# Patient Record
Sex: FEMALE | Race: WHITE | Employment: FULL TIME | URBAN - METROPOLITAN AREA
[De-identification: names, ages, dates, MRNs, and addresses within clinical notes are randomized per-mention and may not be internally consistent; named-entity substitution may affect disease eponyms.]

---

## 2021-07-27 PROBLEM — N81.2 INCOMPLETE UTERINE PROLAPSE: Status: ACTIVE | Noted: 2019-05-24

## 2021-07-27 PROBLEM — N85.01 ENDOMETRIAL HYPERPLASIA WITHOUT ATYPIA, SIMPLE: Status: ACTIVE | Noted: 2021-07-02

## 2021-07-27 PROBLEM — N83.209 OVARIAN CYST: Status: ACTIVE | Noted: 2018-09-21

## 2021-07-27 PROBLEM — N81.11 CYSTOCELE, MIDLINE: Status: ACTIVE | Noted: 2021-07-02

## 2021-07-27 PROBLEM — N92.1 MENORRHAGIA WITH IRREGULAR CYCLE: Status: ACTIVE | Noted: 2021-07-02

## 2021-07-27 RX ORDER — MEDROXYPROGESTERONE ACETATE 10 MG/1
10 TABLET ORAL DAILY
COMMUNITY
Start: 2021-06-11 | End: 2021-11-12 | Stop reason: ALTCHOICE

## 2021-07-28 ENCOUNTER — OFFICE VISIT (OUTPATIENT)
Dept: UROGYNECOLOGY | Age: 51
End: 2021-07-28
Payer: COMMERCIAL

## 2021-07-28 VITALS
TEMPERATURE: 97.5 F | SYSTOLIC BLOOD PRESSURE: 120 MMHG | RESPIRATION RATE: 16 BRPM | OXYGEN SATURATION: 99 % | HEART RATE: 87 BPM | DIASTOLIC BLOOD PRESSURE: 64 MMHG

## 2021-07-28 DIAGNOSIS — N81.11 CYSTOCELE, MIDLINE: ICD-10-CM

## 2021-07-28 DIAGNOSIS — N81.4 UTERINE PROLAPSE: ICD-10-CM

## 2021-07-28 DIAGNOSIS — R35.0 URINARY FREQUENCY: Primary | ICD-10-CM

## 2021-07-28 DIAGNOSIS — N81.6 RECTOCELE: ICD-10-CM

## 2021-07-28 LAB
BILIRUBIN, POC: NORMAL
BLOOD URINE, POC: NORMAL
CLARITY, POC: CLEAR
COLOR, POC: YELLOW
EMPTY COUGH STRESS TEST: NORMAL
FIRST SENSATION: 80 CC
FULL COUGH STRESS TEST: NORMAL
GLUCOSE URINE, POC: NORMAL
KETONES, POC: NORMAL
LEUKOCYTE EST, POC: NORMAL
MAX SENSATION: 250 CC
NITRATE, URINE POC: NORMAL
NITRITE, POC: NORMAL
PH, POC: 6.5
POST VOID RESIDUAL (PVR): 10 ML
PROTEIN, POC: NORMAL
RBC URINE, POC: NORMAL
SECOND SENSATION: 120 CC
SPASM: NORMAL
SPECIFIC GRAVITY, POC: 1.01
UROBILINOGEN, POC: NORMAL
WBC URINE, POC: NORMAL

## 2021-07-28 PROCEDURE — 81002 URINALYSIS NONAUTO W/O SCOPE: CPT | Performed by: OBSTETRICS & GYNECOLOGY

## 2021-07-28 PROCEDURE — 51725 SIMPLE CYSTOMETROGRAM: CPT | Performed by: OBSTETRICS & GYNECOLOGY

## 2021-07-28 PROCEDURE — 99244 OFF/OP CNSLTJ NEW/EST MOD 40: CPT | Performed by: OBSTETRICS & GYNECOLOGY

## 2021-07-28 NOTE — PROGRESS NOTES
2021      HPI:     Name: Peggy Garcia  YOB: 1970    CC: Patient is a 45 y.o. female who is seen in consultation from Kaylynn Keith   for evaluation of prolapse. HPI:  Bladder control problem: no  Bladder emptying problems: yes, How long have you had bladder emptying problems? 5 years  Do you notice any dribbling of urine when you stand after passing urine? No  Do you usually have difficulty starting your urine stream? No  Do you have to assume abnormal positions to urinate? No   Do you have to strain to empty your bladder? No  Is your urine flow: strong  Do you feel as if your bladder is empty after passing urine? No    Prolapse/Vaginal Support problems: yes, Do you notice a bulge? Yes  Are your symptoms worse at the end of the day or after for prolonged periods? Yes  Do you push the protrusion back to help with a bowel movement or to empty your bladder? No  Have you ever used a pessary (plastic support device) for this problem? No        Bowel problem(s): no  Sexual History:  reports previously being sexually active. Pelvic Pain:  no  Ob/Gyn History:    OB History    Para Term  AB Living   2 2 2     2   SAB TAB Ectopic Molar Multiple Live Births             2      # Outcome Date GA Lbr Fernandez/2nd Weight Sex Delivery Anes PTL Lv   2 Term    6 lb 12 oz (3.062 kg)     ERIC   1 Term    8 lb 6 oz (3.799 kg)     ERIC     Past Medical History:   Past Medical History:   Diagnosis Date    Burning sensation of throat     Chronic constipation     Chronic cryptitis of tonsil     Cystocele, midline     Dyspepsia     Endometrial hyperplasia without atypia, simple     Food allergy     Incomplete uterine prolapse     Menorrhagia with irregular cycle     Ovarian cyst     Palpitations     Pneumonia     Vertigo      Past Surgical History:   Past Surgical History:   Procedure Laterality Date    WISDOM TOOTH EXTRACTION       Allergies:    Allergies   Allergen Reactions    Macrobid [Nitrofurantoin Monohydrate Macrocrystals]      Rapid heartbeat, anxiety, shaking     Current Medications:  Current Outpatient Medications   Medication Sig Dispense Refill    medroxyPROGESTERone (PROVERA) 10 MG tablet Take 10 mg by mouth daily       No current facility-administered medications for this visit. Social History:   Social History     Socioeconomic History    Marital status:      Spouse name: Not on file    Number of children: Not on file    Years of education: Not on file    Highest education level: Not on file   Occupational History    Occupation: CPA   Tobacco Use    Smoking status: Never Smoker    Smokeless tobacco: Never Used   Vaping Use    Vaping Use: Never used   Substance and Sexual Activity    Alcohol use: Yes     Comment: occasionally    Drug use: Never    Sexual activity: Not Currently   Other Topics Concern    Not on file   Social History Narrative    Not on file     Social Determinants of Health     Financial Resource Strain:     Difficulty of Paying Living Expenses:    Food Insecurity:     Worried About Running Out of Food in the Last Year:     [address] in the Last Year:    Transportation Needs:     Lack of Transportation (Medical):      Lack of Transportation (Non-Medical):    Physical Activity:     Days of Exercise per Week:     Minutes of Exercise per Session:    Stress:     Feeling of Stress :    Social Connections:     Frequency of Communication with Friends and Family:     Frequency of Social Gatherings with Friends and Family:     Attends Pentecostal Services:     Active Member of Clubs or Organizations:     Attends Club or Organization Meetings:     Marital Status:    Intimate Partner Violence:     Fear of Current or Ex-Partner:     Emotionally Abused:     Physically Abused:     Sexually Abused:      Family History:   Family History   Problem Relation Age of Onset    Cancer Paternal Grandfather 72        pacemaker-    Cancer Maternal Grandmother 28        ovarian    Cancer Maternal Grandfather         Lung    Heart Disease Maternal Grandfather     High Blood Pressure Mother 27    Hypertension Mother     Heart Disease Father     Hypertension Father      Review of System  Review of Systems   Genitourinary: Positive for menstrual problem. All other systems reviewed and are negative. A review of systems was done by the patient and reviewed by me and scanned into media today. Objective:     Vital Signs  Vitals:    07/28/21 1415   BP: 120/64   Pulse: 87   Resp: 16   Temp: 97.5 °F (36.4 °C)   SpO2: 99%     Physical Exam  Physical Exam  HENT:      Head: Normocephalic and atraumatic. Eyes:      Conjunctiva/sclera: Conjunctivae normal.   Pulmonary:      Effort: Pulmonary effort is normal.   Abdominal:      Palpations: Abdomen is soft. Genitourinary:     Comments: Uterine prolapse, cystocele, rectocele  Musculoskeletal:      Cervical back: Normal range of motion and neck supple. Skin:     General: Skin is warm and dry. Neurological:      Mental Status: She is alert and oriented to person, place, and time. Office Fill Study/Urine Dip:     Using sterile technique a manometry catheter was placed. Patient's bladder was filled with sterile water by gravity. Capacity and storage volumes were measured. Spasms assessed. Catheter was removed. Stress urinary incontinence was assessed.     Results for POC orders placed in visit on 07/28/21   POCT Urinalysis no Micro   Result Value Ref Range    Color, UA yellow     Clarity, UA clear     Glucose, UA POC neg     Bilirubin, UA neg     Ketones, UA neg     Spec Grav, UA 1.015     Blood, UA POC neg     pH, UA 6.5     Protein, UA POC neg     Urobilinogen, UA neg     Leukocytes, UA neg     Nitrite, UA neg        Cystometrogram   wbc urine, poc   Date Value Ref Range Status   07/28/2021 neg  Final     rbc urine, poc   Date Value Ref Range Status   07/28/2021 neg  Final     Nitrate, prolapse. Orders Placed This Encounter   Procedures    POCT Urinalysis no Micro    Cystometrogram     No orders of the defined types were placed in this encounter.       Ade Barrios MD

## 2021-08-24 ENCOUNTER — PROCEDURE VISIT (OUTPATIENT)
Dept: UROGYNECOLOGY | Age: 51
End: 2021-08-24
Payer: COMMERCIAL

## 2021-08-24 VITALS
SYSTOLIC BLOOD PRESSURE: 126 MMHG | HEART RATE: 75 BPM | RESPIRATION RATE: 18 BRPM | TEMPERATURE: 96.7 F | DIASTOLIC BLOOD PRESSURE: 66 MMHG | OXYGEN SATURATION: 100 %

## 2021-08-24 DIAGNOSIS — N81.6 RECTOCELE: ICD-10-CM

## 2021-08-24 DIAGNOSIS — N81.4 UTERINE PROLAPSE: ICD-10-CM

## 2021-08-24 DIAGNOSIS — N81.11 CYSTOCELE, MIDLINE: ICD-10-CM

## 2021-08-24 DIAGNOSIS — N30.10 IC (INTERSTITIAL CYSTITIS): ICD-10-CM

## 2021-08-24 DIAGNOSIS — R35.0 URINARY FREQUENCY: Primary | ICD-10-CM

## 2021-08-24 PROCEDURE — 99214 OFFICE O/P EST MOD 30 MIN: CPT | Performed by: OBSTETRICS & GYNECOLOGY

## 2021-08-24 PROCEDURE — 52285 CYSTOSCOPY AND TREATMENT: CPT | Performed by: OBSTETRICS & GYNECOLOGY

## 2021-08-24 NOTE — LETTER
[facility]  [address] [place] [address] Stephania Arias  Phone: [phone]  Fax: [phone]    Kenzie Cantu MD    August 24, 2021     [address] [id number]    Patient: Андрей Nova   MR Number: <[id number]>   YOB: 1970   Date of Visit: 8/24/2021       Dear Riya Shane:    Thank you for referring Андрей Nova to me for evaluation/treatment. Below are the relevant portions of my assessment and plan of care. If you have questions, please do not hesitate to call me. I look forward to following Ahmet Calvo along with you.     Sincerely,        Kenzie Cantu MD

## 2021-08-24 NOTE — PROGRESS NOTES
Sexual Activity    Alcohol use: Yes     Comment: occasionally    Drug use: Never    Sexual activity: Not Currently   Other Topics Concern    Not on file   Social History Narrative    Not on file     Social Determinants of Health     Financial Resource Strain:     Difficulty of Paying Living Expenses:    Food Insecurity:     Worried About Running Out of Food in the Last Year:     [address] in the Last Year:    Transportation Needs:     Lack of Transportation (Medical):  Lack of Transportation (Non-Medical):    Physical Activity:     Days of Exercise per Week:     Minutes of Exercise per Session:    Stress:     Feeling of Stress :    Social Connections:     Frequency of Communication with Friends and Family:     Frequency of Social Gatherings with Friends and Family:     Attends Confucianism Services:     Active Member of Clubs or Organizations:     Attends Club or Organization Meetings:     Marital Status:    Intimate Partner Violence:     Fear of Current or Ex-Partner:     Emotionally Abused:     Physically Abused:     Sexually Abused:      Family History:   Family History   Problem Relation Age of Onset    Cancer Paternal Grandfather 72        pacemaker-    Cancer Maternal Grandmother 28        ovarian    Cancer Maternal Grandfather         Lung    Heart Disease Maternal Grandfather     High Blood Pressure Mother 27    Hypertension Mother     Heart Disease Father     Hypertension Father      Review of System  Review of Systems   Genitourinary: Positive for menstrual problem. All other systems reviewed and are negative. A review of systems was done by the patient and reviewed by me and scanned into media today. Objective:     Vital Signs  Vitals:    21 1358   BP: 126/66   Pulse: 75   Resp: 18   Temp: 96.7 °F (35.9 °C)   SpO2: 100%      Physical Exam  Physical Exam  HENT:      Head: Normocephalic and atraumatic.    Eyes:      Conjunctiva/sclera: Conjunctivae normal.   Pulmonary:      Effort: Pulmonary effort is normal.   Abdominal:      Palpations: Abdomen is soft. Genitourinary:     Comments: Cystocele, rectocele, uterine prolapse  Musculoskeletal:      Cervical back: Normal range of motion and neck supple. Skin:     General: Skin is warm and dry. Neurological:      Mental Status: She is alert and oriented to person, place, and time. Procedure: The urethral was anesthesized with topical lidocaine jelly and dilated to a #14 Thai. A 0-degree urethroscope was used to examine the urethra. A 70-degree cystoscope was used to evaluate the bladder. FINDINGS:  1. Urethra was normal  2. Bladder had trabeculations mild  3. Trigone appeared Normal  4. Ureters illustrated bilateral efflux  5. Patient exhibited spasmsd uring the study no    Cough stress test: Bladder filled to 200 mL. Patient did not leak with cough stress test.    No results found for this visit on 08/24/21. Assessment/Plan:     Christine Adrian is a 45 y.o. female with   1. Urinary frequency    2. Cystocele, midline    3. Uterine prolapse    4. Rectocele    5. IC (interstitial cystitis)      The patient was counseled on surgical and non-surgical options. The patient elected to move forward with surgery. The risks and benefits of surgery including but not limited to bleeding, infection, injury to bowel, bladder, ureter, or other internal organs, transfusion, pain, bowel dysfunction, urinary incontinence, and sexual dysfunction were discussed at length. All questions were answered to the patients satisfaction. The patient elected to undergo posterior repair, enterocele repair, robotic sacral colpopexy and cystourethroscopy. The risk and benefits of synthetic material, including mesh, were explained to the patient and all questions were answered.   This is a cocase with Dr. Nicole Carlson, preop labs were ordered, records were reviewed  Orders Placed This Encounter   Procedures    MN CYSTOSCOPY,RX FEMALE URETHRAL SYND     No orders of the defined types were placed in this encounter.       Santhosh Reyes MD

## 2021-09-13 ENCOUNTER — TELEPHONE (OUTPATIENT)
Dept: UROGYNECOLOGY | Age: 51
End: 2021-09-13

## 2021-09-13 NOTE — TELEPHONE ENCOUNTER
CoCase with Dr Juana Liriano at [address] [place] [address] scheduled for 9/30/21. Pt verbalized agreement for this date. Liliane Chaparro at Dr Bob Alert office made aware of pts verbalized agreement for this date.

## 2021-09-14 ENCOUNTER — TELEPHONE (OUTPATIENT)
Dept: UROGYNECOLOGY | Age: 51
End: 2021-09-14

## 2021-09-14 NOTE — TELEPHONE ENCOUNTER
Patient moved yesterday and has misplaced her Pre-Op paperwork. Requesting that the information be sent to her e-mail:  [email or web address]

## 2021-09-20 ENCOUNTER — TELEPHONE (OUTPATIENT)
Dept: UROGYNECOLOGY | Age: 51
End: 2021-09-20

## 2021-09-20 DIAGNOSIS — Z01.818 PREOP TESTING: ICD-10-CM

## 2021-09-20 DIAGNOSIS — N81.4 UTERINE PROLAPSE: Primary | ICD-10-CM

## 2021-09-20 NOTE — TELEPHONE ENCOUNTER
[facility] Preop states pt needs preop blood orders for surgery. New orders received by Dr Saundra Erickson. Will fax orders to pt at [email or web address] and mail rx's to pts home.

## 2021-09-23 PROBLEM — D25.1 INTRAMURAL LEIOMYOMA OF UTERUS: Status: ACTIVE | Noted: 2021-09-21

## 2021-10-04 ENCOUNTER — TELEPHONE (OUTPATIENT)
Dept: UROGYNECOLOGY | Age: 51
End: 2021-10-04

## 2021-10-04 NOTE — TELEPHONE ENCOUNTER
Patient reports doing well after surgery. She is urinating well, feels a little pain when stream begins. Does not feel like UTI pain, however I asked her to keep a close eye on it, and if it gets worse or starts to feel like burning UTI symptoms to call us back. Positive for BM  Pain is well controlled with round the clock motrin, and tylenol. She has not felt the need to take narcotic pain reliever yet. Asked her to call of the office is she needed anything, she agreed and was thankful .

## 2021-10-06 ENCOUNTER — OUTSIDE SERVICES (OUTPATIENT)
Dept: GYNECOLOGY | Age: 51
End: 2021-10-06
Payer: COMMERCIAL

## 2021-10-06 DIAGNOSIS — N81.5 VAGINAL ENTEROCELE: ICD-10-CM

## 2021-10-06 DIAGNOSIS — N81.4 UTERINE PROLAPSE: ICD-10-CM

## 2021-10-06 PROCEDURE — 58999 UNLISTED PX FML GENITAL SYS: CPT | Performed by: OBSTETRICS & GYNECOLOGY

## 2021-10-06 PROCEDURE — 57425 LAPAROSCOPY SURG COLPOPEXY: CPT | Performed by: OBSTETRICS & GYNECOLOGY

## 2021-10-15 ENCOUNTER — OFFICE VISIT (OUTPATIENT)
Dept: UROGYNECOLOGY | Age: 51
End: 2021-10-15

## 2021-10-15 VITALS
DIASTOLIC BLOOD PRESSURE: 68 MMHG | TEMPERATURE: 98.9 F | OXYGEN SATURATION: 99 % | HEART RATE: 73 BPM | RESPIRATION RATE: 14 BRPM | SYSTOLIC BLOOD PRESSURE: 126 MMHG

## 2021-10-15 DIAGNOSIS — Z09 POSTOP CHECK: Primary | ICD-10-CM

## 2021-10-15 PROCEDURE — 99024 POSTOP FOLLOW-UP VISIT: CPT | Performed by: NURSE PRACTITIONER

## 2021-10-15 RX ORDER — POLYETHYLENE GLYCOL 3350 17 G/17G
17 POWDER, FOR SOLUTION ORAL EVERY MORNING
COMMUNITY
Start: 2021-10-01

## 2021-10-15 RX ORDER — PSEUDOEPHEDRINE HCL 30 MG
100 TABLET ORAL 2 TIMES DAILY
COMMUNITY
Start: 2021-10-01 | End: 2022-01-19 | Stop reason: SDUPTHER

## 2021-10-15 RX ORDER — OXYCODONE HYDROCHLORIDE 5 MG/1
TABLET ORAL
COMMUNITY
Start: 2021-10-01 | End: 2022-02-17 | Stop reason: ALTCHOICE

## 2021-10-15 NOTE — PROGRESS NOTES
10/15/2021       HPI:     Name: Dl Saucedo  YOB: 1970    CC: Dl Saucedo is a 45 y.o. female who is here for a post op evaluation. HPI: Recently underwent   09/30/21  POSTERIOR REPAIR/ ENTEROCELE REPAIR/ CYSTOSCOPY/ SACROCOLPOPEXY ROBOTIC        Voiding difficulty: No  Initiating stream: No  Force stream: No  Lean forward to empty: No  Slow/intermittent stream: No  Unable to empty bladder: No  Incontinence: no   Urgency without incontinence: Yes   Frequency without incontinence: No   Bowel functions: Normal  Pain Controlled: Yes    Past Medical History:   Past Medical History:   Diagnosis Date    Burning sensation of throat     Chronic constipation     Chronic cryptitis of tonsil     Cystocele, midline     Dyspepsia     Endometrial hyperplasia without atypia, simple     Food allergy     Incomplete uterine prolapse     Menorrhagia with irregular cycle     Ovarian cyst     Palpitations     Pneumonia     Vertigo      Past Surgical History:   Past Surgical History:   Procedure Laterality Date    WISDOM TOOTH EXTRACTION       Current Medications:  Current Outpatient Medications   Medication Sig Dispense Refill    docusate (COLACE, DULCOLAX) 100 MG CAPS Take 100 mg by mouth 2 times daily      medroxyPROGESTERone (PROVERA) 10 MG tablet Take 10 mg by mouth daily      oxyCODONE (ROXICODONE) 5 MG immediate release tablet  (Patient not taking: Reported on 10/15/2021)      polyethylene glycol (GLYCOLAX) 17 GM/SCOOP powder Take 17 g by mouth every morning (Patient not taking: Reported on 10/15/2021)       No current facility-administered medications for this visit. Allergies:    Allergies   Allergen Reactions    Macrobid [Nitrofurantoin Monohydrate Macrocrystals]      Rapid heartbeat, anxiety, shaking     Social History:   Social History     Socioeconomic History    Marital status:      Spouse name: Not on file    Number of children: Not on file    Years of education: Not on file    Highest education level: Not on file   Occupational History    Occupation: CPA   Tobacco Use    Smoking status: Never Smoker    Smokeless tobacco: Never Used   Vaping Use    Vaping Use: Never used   Substance and Sexual Activity    Alcohol use: Yes     Comment: occasionally    Drug use: Never    Sexual activity: Not Currently   Other Topics Concern    Not on file   Social History Narrative    Not on file     Social Determinants of Health     Financial Resource Strain:     Difficulty of Paying Living Expenses:    Food Insecurity:     Worried About Running Out of Food in the Last Year:     [address] in the Last Year:    Transportation Needs:     Lack of Transportation (Medical):  Lack of Transportation (Non-Medical):    Physical Activity:     Days of Exercise per Week:     Minutes of Exercise per Session:    Stress:     Feeling of Stress :    Social Connections:     Frequency of Communication with Friends and Family:     Frequency of Social Gatherings with Friends and Family:     Attends Buddhism Services:     Active Member of Clubs or Organizations:     Attends Club or Organization Meetings:     Marital Status:    Intimate Partner Violence:     Fear of Current or Ex-Partner:     Emotionally Abused:     Physically Abused:     Sexually Abused:      Family History:   Family History   Problem Relation Age of Onset    Cancer Paternal Grandfather 72        pacemaker-    Cancer Maternal Grandmother 28        ovarian    Cancer Maternal Grandfather         Lung    Heart Disease Maternal Grandfather     High Blood Pressure Mother 27    Hypertension Mother     Heart Disease Father     Hypertension Father      Review of System   Review of Systems   All other systems reviewed and are negative. A review of systems was done by the patient and reviewed by me and scanned into media today.     Objective:     Vitals  Vitals:    10/15/21 1133   BP: 126/68   Pulse: 73 Resp: 14   Temp: 98.9 °F (37.2 °C)   SpO2: 99%     Physical Exam  Physical Exam  Vitals and nursing note reviewed. Constitutional:       Appearance: Normal appearance. HENT:      Head: Normocephalic. Eyes:      Conjunctiva/sclera: Conjunctivae normal.   Cardiovascular:      Rate and Rhythm: Normal rate. Pulmonary:      Effort: Pulmonary effort is normal.   Musculoskeletal:         General: Normal range of motion. Cervical back: Normal range of motion. Skin:     General: Skin is warm and dry. Neurological:      General: No focal deficit present. Mental Status: She is alert and oriented to person, place, and time. Psychiatric:         Mood and Affect: Mood normal.         Behavior: Behavior normal.         Thought Content: Thought content normal.       All surgical incisions healing well. No erythema. No evidence of hematoma or abscess. No results found for this visit on 10/15/21. Assessnent/Plan:     Pearl Lynn is a 45 y.o. female with No diagnosis found. Patient is doing well 2 weeks. Restrictions reviewed  Patient is to follow up in 4 weeks . Varghese Brenner, APRN - CNP      No orders of the defined types were placed in this encounter. No orders of the defined types were placed in this encounter.       Shante Sosa RN

## 2021-11-12 ENCOUNTER — OFFICE VISIT (OUTPATIENT)
Dept: UROGYNECOLOGY | Age: 51
End: 2021-11-12

## 2021-11-12 VITALS
OXYGEN SATURATION: 100 % | TEMPERATURE: 96.9 F | RESPIRATION RATE: 18 BRPM | HEART RATE: 72 BPM | DIASTOLIC BLOOD PRESSURE: 66 MMHG | SYSTOLIC BLOOD PRESSURE: 118 MMHG

## 2021-11-12 DIAGNOSIS — N30.10 IC (INTERSTITIAL CYSTITIS): ICD-10-CM

## 2021-11-12 DIAGNOSIS — Z09 POSTOP CHECK: Primary | ICD-10-CM

## 2021-11-12 PROCEDURE — 99024 POSTOP FOLLOW-UP VISIT: CPT | Performed by: NURSE PRACTITIONER

## 2021-11-12 NOTE — PROGRESS NOTES
11/12/2021       HPI:     Name: Kelsey Coburn  YOB: 1970    CC: Kelsey Coburn is a 45 y.o. female who is here for a post op evaluation. HPI: Recently underwent on 09/30/21  POSTERIOR REPAIR/ ENTEROCELE REPAIR/ CYSTOSCOPY/ SACROCOLPOPEXY ROBOTIC. Voiding difficulty: No  Initiating stream: Yes  Force stream: No  Lean forward to empty: No  Slow/intermittent stream: No  Unable to empty bladder: No  Incontinence: no  Urgency without incontinence: No   Frequency without incontinence: No present prior to surgery  Bowel functions: constipation -patient reports constipation started about yesterday  Pain Controlled: Yes-for most part, still a dull ache on lower left side. Past Medical History:   Past Medical History:   Diagnosis Date    Burning sensation of throat     Chronic constipation     Chronic cryptitis of tonsil     Cystocele, midline     Dyspepsia     Endometrial hyperplasia without atypia, simple     Food allergy     Incomplete uterine prolapse     Menorrhagia with irregular cycle     Ovarian cyst     Palpitations     Pneumonia     Vertigo      Past Surgical History:   Past Surgical History:   Procedure Laterality Date    WISDOM TOOTH EXTRACTION       Current Medications:  Current Outpatient Medications   Medication Sig Dispense Refill    docusate (COLACE, DULCOLAX) 100 MG CAPS Take 100 mg by mouth 2 times daily (Patient not taking: Reported on 11/12/2021)      oxyCODONE (ROXICODONE) 5 MG immediate release tablet  (Patient not taking: Reported on 11/12/2021)      polyethylene glycol (GLYCOLAX) 17 GM/SCOOP powder Take 17 g by mouth every morning  (Patient not taking: Reported on 11/12/2021)       No current facility-administered medications for this visit. Allergies:    Allergies   Allergen Reactions    Macrobid [Nitrofurantoin Monohydrate Macrocrystals]      Rapid heartbeat, anxiety, shaking     Social History:   Social History     Socioeconomic History    Marital status:      Spouse name: Not on file    Number of children: Not on file    Years of education: Not on file    Highest education level: Not on file   Occupational History    Occupation: CPA   Tobacco Use    Smoking status: Never Smoker    Smokeless tobacco: Never Used   Vaping Use    Vaping Use: Never used   Substance and Sexual Activity    Alcohol use: Yes     Comment: occasionally    Drug use: Never    Sexual activity: Not Currently   Other Topics Concern    Not on file   Social History Narrative    Not on file     Social Determinants of Health     Financial Resource Strain:     Difficulty of Paying Living Expenses: Not on file   Food Insecurity:     Worried About Running Out of Food in the Last Year: Not on file    Saji of Food in the Last Year: Not on file   Transportation Needs:     Lack of Transportation (Medical): Not on file    Lack of Transportation (Non-Medical):  Not on file   Physical Activity:     Days of Exercise per Week: Not on file    Minutes of Exercise per Session: Not on file   Stress:     Feeling of Stress : Not on file   Social Connections:     Frequency of Communication with Friends and Family: Not on file    Frequency of Social Gatherings with Friends and Family: Not on file    Attends Congregational Services: Not on file    Active Member of [address] [place] [address] [employer] or Organizations: Not on file    Attends Club or Organization Meetings: Not on file    Marital Status: Not on file   Intimate Partner Violence:     Fear of Current or Ex-Partner: Not on file    Emotionally Abused: Not on file    Physically Abused: Not on file    Sexually Abused: Not on file   Housing Stability:     Unable to Pay for Housing in the Last Year: Not on file    Number of [place] in the Last Year: Not on file    Unstable Housing in the Last Year: Not on file     Family History:   Family History   Problem Relation Age of Onset    Cancer Paternal Grandfather 72        pacemaker-    Cancer Maternal Grandmother 28        ovarian    Cancer Maternal Grandfather         Lung    Heart Disease Maternal Grandfather     High Blood Pressure Mother 27    Hypertension Mother     Heart Disease Father     Hypertension Father      Review of System   Review of Systems   All other systems reviewed and are negative. A review of systems was done by the patient and reviewed by me and scanned into media today. Objective:     Vitals  Vitals:    11/12/21 1101   BP: 118/66   Pulse: 72   Resp: 18   Temp: 96.9 °F (36.1 °C)   SpO2: 100%     Physical Exam  Physical Exam  Vitals and nursing note reviewed. Constitutional:       Appearance: Normal appearance. HENT:      Head: Normocephalic. Eyes:      Conjunctiva/sclera: Conjunctivae normal.   Cardiovascular:      Rate and Rhythm: Normal rate. Pulmonary:      Effort: Pulmonary effort is normal.   Musculoskeletal:         General: Normal range of motion. Cervical back: Normal range of motion. Skin:     General: Skin is warm and dry. Neurological:      General: No focal deficit present. Mental Status: She is alert. Psychiatric:         Mood and Affect: Mood normal.         Behavior: Behavior normal.       All surgical incisions healing well. No erythema. No evidence of hematoma or abscess. No results found for this visit on 11/12/21. Assessnent/Plan:     Ada Pool is a 45 y.o. female with   1. Postop check    2. IC (interstitial cystitis)        Patient is doing well 6 weeks. Restrictions reviewed  Patient does have history of IC. We reviewed bladder diet, benefits of PFPT and yoga/stress relief. She is aware of instillations as treatment. She prefers to try to manage without medication  Patient is to follow up in 6 weeks. TOBIN Wright CNP      No orders of the defined types were placed in this encounter. No orders of the defined types were placed in this encounter.       TOBIN Wright CNP

## 2022-01-19 RX ORDER — PSEUDOEPHEDRINE HCL 30 MG
100 TABLET ORAL 2 TIMES DAILY
Qty: 60 CAPSULE | Refills: 0 | Status: SHIPPED | OUTPATIENT
Start: 2022-01-19 | End: 2022-10-25 | Stop reason: SDUPTHER

## 2022-02-08 ENCOUNTER — OFFICE VISIT (OUTPATIENT)
Dept: UROGYNECOLOGY | Age: 52
End: 2022-02-08
Payer: COMMERCIAL

## 2022-02-08 VITALS
HEIGHT: 66 IN | HEART RATE: 64 BPM | DIASTOLIC BLOOD PRESSURE: 79 MMHG | WEIGHT: 124 LBS | SYSTOLIC BLOOD PRESSURE: 122 MMHG | BODY MASS INDEX: 19.93 KG/M2

## 2022-02-08 DIAGNOSIS — R35.0 URINARY FREQUENCY: ICD-10-CM

## 2022-02-08 DIAGNOSIS — R39.15 URGENCY OF URINATION: Primary | ICD-10-CM

## 2022-02-08 DIAGNOSIS — N30.10 IC (INTERSTITIAL CYSTITIS): ICD-10-CM

## 2022-02-08 LAB
BILIRUBIN, POC: NORMAL
BLOOD URINE, POC: NORMAL
CLARITY, POC: CLEAR
COLOR, POC: YELLOW
GLUCOSE URINE, POC: NORMAL
KETONES, POC: NORMAL
LEUKOCYTE EST, POC: NORMAL
NITRITE, POC: NORMAL
PH, POC: 6.5
PROTEIN, POC: NORMAL
SPECIFIC GRAVITY, POC: 1.01
UROBILINOGEN, POC: NORMAL

## 2022-02-08 PROCEDURE — 99213 OFFICE O/P EST LOW 20 MIN: CPT | Performed by: NURSE PRACTITIONER

## 2022-02-08 PROCEDURE — 81002 URINALYSIS NONAUTO W/O SCOPE: CPT | Performed by: NURSE PRACTITIONER

## 2022-02-08 PROCEDURE — 51701 INSERT BLADDER CATHETER: CPT | Performed by: NURSE PRACTITIONER

## 2022-02-08 RX ORDER — LIDOCAINE HYDROCHLORIDE 20 MG/ML
20 INJECTION, SOLUTION INFILTRATION; PERINEURAL ONCE
Status: COMPLETED | OUTPATIENT
Start: 2022-02-08 | End: 2022-02-08

## 2022-02-08 RX ORDER — HEPARIN SODIUM 10000 [USP'U]/ML
10000 INJECTION, SOLUTION INTRAVENOUS; SUBCUTANEOUS ONCE
Status: COMPLETED | OUTPATIENT
Start: 2022-02-08 | End: 2022-02-08

## 2022-02-08 RX ADMIN — HEPARIN SODIUM 10000 UNITS: 10000 INJECTION, SOLUTION INTRAVENOUS; SUBCUTANEOUS at 10:10

## 2022-02-08 RX ADMIN — LIDOCAINE HYDROCHLORIDE 20 ML: 20 INJECTION, SOLUTION INFILTRATION; PERINEURAL at 10:10

## 2022-02-08 RX ADMIN — Medication 5 MEQ: at 10:10

## 2022-02-08 NOTE — PROGRESS NOTES
2022       HPI:     Name: Max Branham  YOB: 1970    CC: Patient is a 45 y.o. presenting for evaluation of bladder pain. HPI: How long have you had this problem? Uncertain  Please rate the severity of your problem: moderate  Anything make it better? Nothing    Patient reports a constant feeling of pressure in her lower abdomen/bladder/vagina. Prolapse surgery 21:  PROCEDURES PERFORMED:    hysterectomy by Dr Dozier Spina  Robotic sacrocolpopexy (Restorelle - Coloplast)   Abdominal enterocele repair    Cysto: 21   Procedure: The urethral was anesthesized with topical lidocaine jelly and dilated to a #14 [place]. A 0-degree urethroscope was used to examine the urethra. A 70-degree cystoscope was used to evaluate the bladder.     FINDINGS:  1. Urethra was normal  2. Bladder had trabeculations mild  3. Trigone appeared Normal  4. Ureters illustrated bilateral efflux  5. Patient exhibited spasmsd uring the study no     Gigi Cobb states she has  Ob/Gyn History:    OB History    Para Term  AB Living   2 2 2     2   SAB IAB Ectopic Molar Multiple Live Births             2      # Outcome Date GA Lbr Fernandez/2nd Weight Sex Delivery Anes PTL Lv   2 Term    6 lb 12 oz (3.062 kg)     ERIC   1 Term    8 lb 6 oz (3.799 kg)     ERIC     Past Medical History:   Past Medical History:   Diagnosis Date    Burning sensation of throat     Chronic constipation     Chronic cryptitis of tonsil     Cystocele, midline     Dyspepsia     Endometrial hyperplasia without atypia, simple     Food allergy     Incomplete uterine prolapse     Menorrhagia with irregular cycle     Ovarian cyst     Palpitations     Pneumonia     Vertigo      Past Surgical History:   Past Surgical History:   Procedure Laterality Date    WISDOM TOOTH EXTRACTION       Allergies:    Allergies   Allergen Reactions    Macrobid [Nitrofurantoin Monohydrate Macrocrystals]      Rapid heartbeat, anxiety, shaking     Current Medications:  Current Outpatient Medications   Medication Sig Dispense Refill    psyllium (KONSYL) 28.3 % PACK Take 1 packet by mouth daily      docusate (COLACE, DULCOLAX) 100 MG CAPS Take 100 mg by mouth 2 times daily 60 capsule 0    oxyCODONE (ROXICODONE) 5 MG immediate release tablet  (Patient not taking: Reported on 11/12/2021)      polyethylene glycol (GLYCOLAX) 17 GM/SCOOP powder Take 17 g by mouth every morning  (Patient not taking: Reported on 11/12/2021)       No current facility-administered medications for this visit. Social History:   Social History     Socioeconomic History    Marital status:      Spouse name: Not on file    Number of children: Not on file    Years of education: Not on file    Highest education level: Not on file   Occupational History    Occupation: CPA   Tobacco Use    Smoking status: Never Smoker    Smokeless tobacco: Never Used   Vaping Use    Vaping Use: Never used   Substance and Sexual Activity    Alcohol use: Yes     Comment: occasionally    Drug use: Never    Sexual activity: Not Currently   Other Topics Concern    Not on file   Social History Narrative    Not on file     Social Determinants of Health     Financial Resource Strain:     Difficulty of Paying Living Expenses: Not on file   Food Insecurity:     Worried About [address] in the Last Year: Not on file    Saji of Food in the Last Year: Not on file   Transportation Needs:     Lack of Transportation (Medical): Not on file    Lack of Transportation (Non-Medical):  Not on file   Physical Activity:     Days of Exercise per Week: Not on file    Minutes of Exercise per Session: Not on file   Stress:     Feeling of Stress : Not on file   Social Connections:     Frequency of Communication with Friends and Family: Not on file    Frequency of Social Gatherings with Friends and Family: Not on file    Attends Orthodox Services: Not on file   [employer] of Clubs or Organizations: Not on file    Attends Club or Organization Meetings: Not on file    Marital Status: Not on file   Intimate Partner Violence:     Fear of Current or Ex-Partner: Not on file    Emotionally Abused: Not on file    Physically Abused: Not on file    Sexually Abused: Not on file   Housing Stability:     Unable to Pay for Housing in the Last Year: Not on file    Number of [place] in the Last Year: Not on file    Unstable Housing in the Last Year: Not on file     Family History:   Family History   Problem Relation Age of Onset    Cancer Paternal Grandfather 72        pacemaker-    Cancer Maternal Grandmother 28        ovarian    Cancer Maternal Grandfather         Lung    Heart Disease Maternal Grandfather     High Blood Pressure Mother 27    Hypertension Mother     Heart Disease Father     Hypertension Father      Review of System   Review of Systems   Genitourinary: Positive for dysuria, frequency, pelvic pain and urgency. A review of systems was done by the patient and reviewed by me and scanned into media today. Objective:     Vitals  Vitals:    22 1006   BP: 122/79   Pulse: 64   Weight: 124 lb (56.2 kg)   Height: 5' 6\" (1.676 m)     Physical Exam  Physical Exam  Vitals and nursing note reviewed. Constitutional:       Appearance: Normal appearance. HENT:      Head: Normocephalic. Eyes:      Conjunctiva/sclera: Conjunctivae normal.   Cardiovascular:      Rate and Rhythm: Normal rate. Pulmonary:      Effort: Pulmonary effort is normal.   Genitourinary:     Comments: Well supported. Pelvic floor muscles Non tender  Musculoskeletal:         General: Normal range of motion. Cervical back: Normal range of motion. Skin:     General: Skin is warm and dry. Neurological:      General: No focal deficit present. Mental Status: She is alert.    Psychiatric:         Mood and Affect: Mood normal.         Behavior: Behavior normal.          Patient counseled and consented on bladder instillation. Urethra was cleaned in sterile fashion. The urethra was then anesthetized using lidocaine gel. Using a sterile catheter, the bladder was first drained then instilled with a medication solution containing heparin sodium 10,000 u, Sol-Cortef 100 mg, 8.4% Sodium Bicarbonate 50 mEq, and Lidocaine 2% 20 mg. Once the entire volume of the solution was instilled, the catheter was removed. The patient was asked to retain the instillation in their bladder for a minimum of 1 hour and should not exceed 3 hours. Patient tolerated the procedure well. She is to call the office with any concerns. Results for POC orders placed in visit on 02/08/22   POCT Urinalysis no Micro   Result Value Ref Range    Color, UA yellow     Clarity, UA clear     Glucose, UA POC neg     Bilirubin, UA neg     Ketones, UA neg     Spec Grav, UA 1.015     Blood, UA POC neg     pH, UA 6.5     Protein, UA POC neg     Urobilinogen, UA neg     Leukocytes, UA neg     Nitrite, UA neg           Assessment/Plan:     Luis Saxena is a 45 y.o. female with   1. Urgency of urination    2. Urinary frequency    3. IC (interstitial cystitis)      -IC vs OAB:  Instillation:  Patient given bladder instillation  She will monitory her symptoms over the next week and return to discuss. If not effective we will consider trial of anticholinergic medication.   Reviewed diet and she is following her bladder diet recommendations  She will return 1 week  for follow up and treatment     TOBIN Ortiz CNP    Orders Placed This Encounter   Procedures    POCT Urinalysis no Micro    77218 - HI INSERT,NON-INDWELLING BLADDER CATHETER     Orders Placed This Encounter   Medications    lidocaine 2 % injection 20 mL    heparin (porcine) injection 10,000 Units    hydrocortisone sodium succinate PF (SOLU-CORTEF) injection 100 mg    sodium bicarbonate 8.4 % injection 5 mEq       TOBIN Ortiz CNP

## 2022-02-17 ENCOUNTER — OFFICE VISIT (OUTPATIENT)
Dept: UROGYNECOLOGY | Age: 52
End: 2022-02-17
Payer: COMMERCIAL

## 2022-02-17 VITALS
SYSTOLIC BLOOD PRESSURE: 118 MMHG | RESPIRATION RATE: 17 BRPM | DIASTOLIC BLOOD PRESSURE: 75 MMHG | OXYGEN SATURATION: 100 % | TEMPERATURE: 98 F | HEART RATE: 65 BPM

## 2022-02-17 DIAGNOSIS — K59.00 CONSTIPATION, UNSPECIFIED CONSTIPATION TYPE: ICD-10-CM

## 2022-02-17 DIAGNOSIS — N30.10 IC (INTERSTITIAL CYSTITIS): Primary | ICD-10-CM

## 2022-02-17 PROCEDURE — 81002 URINALYSIS NONAUTO W/O SCOPE: CPT | Performed by: NURSE PRACTITIONER

## 2022-02-17 PROCEDURE — 51701 INSERT BLADDER CATHETER: CPT | Performed by: NURSE PRACTITIONER

## 2022-02-17 PROCEDURE — 99213 OFFICE O/P EST LOW 20 MIN: CPT | Performed by: NURSE PRACTITIONER

## 2022-02-17 RX ORDER — DOCUSATE SODIUM 100 MG/1
100 CAPSULE, LIQUID FILLED ORAL 2 TIMES DAILY
Qty: 120 CAPSULE | Refills: 2 | Status: SHIPPED | OUTPATIENT
Start: 2022-02-17 | End: 2022-08-16

## 2022-02-17 RX ORDER — LIDOCAINE HYDROCHLORIDE 10 MG/ML
30 INJECTION, SOLUTION INFILTRATION; PERINEURAL ONCE
Status: COMPLETED | OUTPATIENT
Start: 2022-02-17 | End: 2022-02-17

## 2022-02-17 RX ORDER — HEPARIN SODIUM 10000 [USP'U]/ML
10000 INJECTION, SOLUTION INTRAVENOUS; SUBCUTANEOUS ONCE
Status: COMPLETED | OUTPATIENT
Start: 2022-02-17 | End: 2022-02-17

## 2022-02-17 RX ADMIN — LIDOCAINE HYDROCHLORIDE 30 ML: 10 INJECTION, SOLUTION INFILTRATION; PERINEURAL at 10:17

## 2022-02-17 RX ADMIN — HEPARIN SODIUM 10000 UNITS: 10000 INJECTION, SOLUTION INTRAVENOUS; SUBCUTANEOUS at 10:18

## 2022-02-17 RX ADMIN — Medication 5 MEQ: at 10:18

## 2022-02-17 NOTE — PROGRESS NOTES
2022       HPI:     Name: Mimi Christopher  YOB: 1970    CC: Patient is a 45 y.o. presenting for evaluation of has been getting instillations. HPI: How long have you had this problem? months  Please rate the severity of your problem: moderate  Anything make it better? Patient states she feels good. The instillations have helped. She reports excellent results with instillation last week but also was able to identify a trigger to her bladder pain. She has been taking citrucel for constipation and had stopped it a few days last week. Upon restarting her bladder pain returned immediately, she then realized the first ingredient is Citric acid. Her bladder pain has been again minimal upon taking this out of her daily routine. She would like instillation today and then will plan to return with flares  As well, she would like to begin PFPT. Ob/Gyn History:    OB History    Para Term  AB Living   2 2 2     2   SAB IAB Ectopic Molar Multiple Live Births             2      # Outcome Date GA Lbr Fernandez/2nd Weight Sex Delivery Anes PTL Lv   2 Term    6 lb 12 oz (3.062 kg)     ERIC   1 Term    8 lb 6 oz (3.799 kg)     ERIC     Past Medical History:   Past Medical History:   Diagnosis Date    Burning sensation of throat     Chronic constipation     Chronic cryptitis of tonsil     Cystocele, midline     Dyspepsia     Endometrial hyperplasia without atypia, simple     Food allergy     Incomplete uterine prolapse     Menorrhagia with irregular cycle     Ovarian cyst     Palpitations     Pneumonia     Vertigo      Past Surgical History:   Past Surgical History:   Procedure Laterality Date    WISDOM TOOTH EXTRACTION       Allergies:    Allergies   Allergen Reactions    Macrobid [Nitrofurantoin Monohydrate Macrocrystals]      Rapid heartbeat, anxiety, shaking     Current Medications:  Current Outpatient Medications   Medication Sig Dispense Refill    docusate sodium (COLACE) 100 MG capsule Take 1 capsule by mouth 2 times daily 120 capsule 2    psyllium (KONSYL) 28.3 % PACK Take 1 packet by mouth daily      docusate (COLACE, DULCOLAX) 100 MG CAPS Take 100 mg by mouth 2 times daily 60 capsule 0    polyethylene glycol (GLYCOLAX) 17 GM/SCOOP powder Take 17 g by mouth every morning  (Patient not taking: Reported on 11/12/2021)       No current facility-administered medications for this visit. Social History:   Social History     Socioeconomic History    Marital status:      Spouse name: Not on file    Number of children: Not on file    Years of education: Not on file    Highest education level: Not on file   Occupational History    Occupation: CPA   Tobacco Use    Smoking status: Never Smoker    Smokeless tobacco: Never Used   Vaping Use    Vaping Use: Never used   Substance and Sexual Activity    Alcohol use: Yes     Comment: occasionally    Drug use: Never    Sexual activity: Not Currently   Other Topics Concern    Not on file   Social History Narrative    Not on file     Social Determinants of Health     Financial Resource Strain:     Difficulty of Paying Living Expenses: Not on file   Food Insecurity:     Worried About [address] [employer] in the Last Year: Not on file    Saji of Food in the Last Year: Not on file   Transportation Needs:     Lack of Transportation (Medical): Not on file    Lack of Transportation (Non-Medical):  Not on file   Physical Activity:     Days of Exercise per Week: Not on file    Minutes of Exercise per Session: Not on file   Stress:     Feeling of Stress : Not on file   Social Connections:     Frequency of Communication with Friends and Family: Not on file    Frequency of Social Gatherings with Friends and Family: Not on file    Attends Christian Services: Not on file    Active Member of Clubs or Organizations: Not on file    Attends Club or Organization Meetings: Not on file    Marital Status: Not on file   Intimate Partner Violence:     Fear of Current or Ex-Partner: Not on file    Emotionally Abused: Not on file    Physically Abused: Not on file    Sexually Abused: Not on file   Housing Stability:     Unable to Pay for Housing in the Last Year: Not on file    Number of Places Lived in the Last Year: Not on file    Unstable Housing in the Last Year: Not on file     Family History:   Family History   Problem Relation Age of Onset    Cancer Paternal Grandfather 72        pacemaker-    Cancer Maternal Grandmother 28        ovarian    Cancer Maternal Grandfather         Lung    Heart Disease Maternal Grandfather     High Blood Pressure Mother 27    Hypertension Mother     Heart Disease Father     Hypertension Father      Review of System   Review of Systems   All other systems reviewed and are negative. A review of systems was done by the patient and reviewed by me. Objective:     Vitals  Vitals:    22 0947   BP: 118/75   Pulse: 65   Resp: 17   Temp: 98 °F (36.7 °C)   SpO2: 100%     Physical Exam  Physical Exam  Vitals and nursing note reviewed. Constitutional:       Appearance: Normal appearance. HENT:      Head: Normocephalic. Eyes:      Conjunctiva/sclera: Conjunctivae normal.   Cardiovascular:      Rate and Rhythm: Normal rate. Pulmonary:      Effort: Pulmonary effort is normal.   Musculoskeletal:         General: Normal range of motion. Cervical back: Normal range of motion. Skin:     General: Skin is warm and dry. Neurological:      General: No focal deficit present. Mental Status: She is alert. Psychiatric:         Mood and Affect: Mood normal.         Behavior: Behavior normal.       Patient counseled and consented on bladder instillation. Urethra was cleaned in sterile fashion. The urethra was then anesthetized using lidocaine gel.   Using a catheter, the bladder was first drained then instilled with a medication solution containing heparin sodium 10,000 u, Sol-Cortef 100 mg, 8.4% Sodium Bicarbonate 50 mEq, and Lidocaine 1%  30 mg. Once the entire volume of the solution was instilled, the catheter was removed. The patient was asked to retain the instillation in their bladder for a minimum of 1 hour and should not exceed 3 hours. Patient tolerated the procedure well. She is to call the office with any concerns. No results found for this visit on 02/17/22. Assessment/Plan:     Jamila Pleitez is a 45 y.o. female with   1. IC (interstitial cystitis)    2. Constipation, unspecified constipation type      -Records reviewed  -Stable IC:  Given bladder instillation  Patient is doing well at this time. She will continue to follow her bladder diet and has learned of some specific causes of her flares.   She will follow up prn for bladder pain and treatments.  -Constipation:  Colace Rx    Referral given for PFPT    She will follow up prn at this time  TOBIN Wheeler CNP      Orders Placed This Encounter   Procedures    PT pelvic floor activity     The patient can be scheduled with any member of the group including the provider with first available appointment     Standing Status:   Future     Standing Expiration Date:   2/17/2023     Scheduling Instructions:      Pelvic floor physical therapy      Evaluation and treatment     Orders Placed This Encounter   Medications    docusate sodium (COLACE) 100 MG capsule     Sig: Take 1 capsule by mouth 2 times daily     Dispense:  120 capsule     Refill:  [address], APRN - CNP

## 2022-05-17 ENCOUNTER — OFFICE VISIT (OUTPATIENT)
Dept: UROGYNECOLOGY | Age: 52
End: 2022-05-17
Payer: COMMERCIAL

## 2022-05-17 VITALS
SYSTOLIC BLOOD PRESSURE: 111 MMHG | OXYGEN SATURATION: 99 % | RESPIRATION RATE: 14 BRPM | TEMPERATURE: 98 F | DIASTOLIC BLOOD PRESSURE: 59 MMHG | HEART RATE: 68 BPM

## 2022-05-17 DIAGNOSIS — R35.0 URINARY FREQUENCY: Primary | ICD-10-CM

## 2022-05-17 DIAGNOSIS — R39.15 URINARY URGENCY: ICD-10-CM

## 2022-05-17 DIAGNOSIS — N95.2 VAGINAL ATROPHY: ICD-10-CM

## 2022-05-17 PROCEDURE — 99214 OFFICE O/P EST MOD 30 MIN: CPT | Performed by: OBSTETRICS & GYNECOLOGY

## 2022-05-17 RX ORDER — SOLIFENACIN SUCCINATE 10 MG/1
10 TABLET, FILM COATED ORAL DAILY
Qty: 30 TABLET | Refills: 2 | Status: SHIPPED | OUTPATIENT
Start: 2022-05-17

## 2022-05-17 RX ORDER — ESTRADIOL 0.1 MG/G
0.25 CREAM VAGINAL DAILY
Qty: 30 G | Refills: 2 | Status: SHIPPED | OUTPATIENT
Start: 2022-05-17

## 2022-05-17 NOTE — LETTER
[facility]  [address] [place] [address] Stephania Arias  Phone: [phone]  Fax: [phone]    Cruz Brooks MD    May 17, 2022     [facility] [id number]    Patient: Torie Grant   MR Number: [id number]   YOB: 1970   Date of Visit: 5/17/2022       Dear Gissel Corcoran:    Thank you for referring Torie Grant to me for evaluation/treatment. Below are the relevant portions of my assessment and plan of care. If you have questions, please do not hesitate to call me. I look forward to following Rhonda Neves along with you.     Sincerely,      Cruz Brooks MD

## 2022-05-17 NOTE — PROGRESS NOTES
Reported on 5/17/2022)      polyethylene glycol (GLYCOLAX) 17 GM/SCOOP powder Take 17 g by mouth every morning  (Patient not taking: Reported on 11/12/2021)       No current facility-administered medications for this visit. Social History:   Social History     Socioeconomic History    Marital status:      Spouse name: Not on file    Number of children: Not on file    Years of education: Not on file    Highest education level: Not on file   Occupational History    Occupation: CPA   Tobacco Use    Smoking status: Never Smoker    Smokeless tobacco: Never Used   Vaping Use    Vaping Use: Never used   Substance and Sexual Activity    Alcohol use: Yes     Comment: occasionally    Drug use: Never    Sexual activity: Not Currently   Other Topics Concern    Not on file   Social History Narrative    Not on file     Social Determinants of Health     Financial Resource Strain:     Difficulty of Paying Living Expenses: Not on file   Food Insecurity:     Worried About [address] [employer] in the Last Year: Not on file    Saji of Food in the Last Year: Not on file   Transportation Needs:     Lack of Transportation (Medical): Not on file    Lack of Transportation (Non-Medical):  Not on file   Physical Activity:     Days of Exercise per Week: Not on file    Minutes of Exercise per Session: Not on file   Stress:     Feeling of Stress : Not on file   Social Connections:     Frequency of Communication with Friends and Family: Not on file    Frequency of Social Gatherings with Friends and Family: Not on file    Attends Restorationism Services: Not on file    Active Member of Clubs or Organizations: Not on file    Attends Club or Organization Meetings: Not on file    Marital Status: Not on file   Intimate Partner Violence:     Fear of Current or Ex-Partner: Not on file    Emotionally Abused: Not on file    Physically Abused: Not on file    Sexually Abused: Not on file   Housing Stability:     Unable to Pay for Housing in the Last Year: Not on file    Number of Places Lived in the Last Year: Not on file    Unstable Housing in the Last Year: Not on file     Family History:   Family History   Problem Relation Age of Onset    Cancer Paternal Grandfather 72        pacemaker-    Cancer Maternal Grandmother 28        ovarian    Cancer Maternal Grandfather         Lung    Heart Disease Maternal Grandfather     High Blood Pressure Mother 27    Hypertension Mother     Heart Disease Father     Hypertension Father          Objective:     Vitals  Vitals:    22 1541   BP: (!) 111/59   Pulse: 68   Resp: 14   Temp: 98 °F (36.7 °C)   SpO2: 99%     Physical Exam  Physical Exam  HENT:      Head: Normocephalic and atraumatic. Eyes:      Conjunctiva/sclera: Conjunctivae normal.   Pulmonary:      Effort: Pulmonary effort is normal.   Abdominal:      Palpations: Abdomen is soft. Genitourinary:     Comments: Good support to the pelvic floor, vaginal atrophy  Musculoskeletal:      Cervical back: Normal range of motion and neck supple. Skin:     General: Skin is warm and dry. Neurological:      Mental Status: She is alert and oriented to person, place, and time. No results found for this visit on 22. Assessment/Plan:     Chioma To is a 45 y.o. female with   1. Urinary frequency    2. Urinary urgency    3. Vaginal atrophy    Old records reviewed, outside records reviewed    Zonia Varghese presents today really complaining of urinary urgency frequency and urge incontinence. It has been quite bothersome to her. She did follow-up with pelvic floor physical therapy however on my examination today her pelvic floor muscles are very weak. She has a previous diagnosis of interstitial cystitis however she tried a couple of installations and it did not help.   I do think she has symptoms of overactive bladder and I am going to have her try Vesicare and go back to pelvic floor physical therapy at .  She will call us with the name of a pelvic floor physical therapist closer to her home in [place]. No orders of the defined types were placed in this encounter. Orders Placed This Encounter   Medications    solifenacin (VESICARE) 10 MG tablet     Sig: Take 1 tablet by mouth daily     Dispense:  30 tablet     Refill:  2    estradiol (ESTRACE VAGINAL) 0.1 MG/GM vaginal cream     Sig: Place 0.25 g vaginally daily Apply 0.25g with fingertip to the vaginal opening every night at bedtime for 2 weeks, then decrease to twice weekly.      Dispense:  30 g     Refill:  2       Ade Barrios MD

## 2022-06-13 RX ORDER — SOLIFENACIN SUCCINATE 10 MG/1
TABLET, FILM COATED ORAL
Qty: 30 TABLET | Refills: 2 | OUTPATIENT
Start: 2022-06-13

## 2022-09-20 ENCOUNTER — TELEPHONE (OUTPATIENT)
Dept: UROGYNECOLOGY | Age: 52
End: 2022-09-20

## 2022-09-20 RX ORDER — DOCUSATE SODIUM 100 MG/1
100 CAPSULE, LIQUID FILLED ORAL 2 TIMES DAILY
Qty: 60 CAPSULE | Refills: 0 | Status: SHIPPED | OUTPATIENT
Start: 2022-09-20 | End: 2022-10-20

## 2022-09-20 NOTE — TELEPHONE ENCOUNTER
Patient would like refill on Docusete. It's cheaper if we send in a prescription then it is if she purchases over the counter. [facility] [phone].  Thank you

## 2022-10-25 ENCOUNTER — TELEPHONE (OUTPATIENT)
Dept: UROGYNECOLOGY | Age: 52
End: 2022-10-25

## 2022-10-25 RX ORDER — PSEUDOEPHEDRINE HCL 30 MG
100 TABLET ORAL 2 TIMES DAILY
Qty: 60 CAPSULE | Refills: 9 | Status: SHIPPED | OUTPATIENT
Start: 2022-10-25

## 2022-10-25 NOTE — TELEPHONE ENCOUNTER
Spoke with patient who is wanting a refill on Colace prescription. Will confirm with Jareth Clements NP that this is okay to refill for patient.

## 2022-10-25 NOTE — TELEPHONE ENCOUNTER
Patient needs a refill called in, also requesting more than one month supply this time if possible.  Thank you

## 2023-08-10 ENCOUNTER — OFFICE VISIT (OUTPATIENT)
Dept: UROGYNECOLOGY | Age: 53
End: 2023-08-10

## 2023-08-10 VITALS
OXYGEN SATURATION: 99 % | SYSTOLIC BLOOD PRESSURE: 115 MMHG | HEART RATE: 73 BPM | TEMPERATURE: 98 F | RESPIRATION RATE: 18 BRPM | DIASTOLIC BLOOD PRESSURE: 73 MMHG

## 2023-08-10 DIAGNOSIS — R39.15 URINARY URGENCY: Primary | ICD-10-CM

## 2023-08-10 DIAGNOSIS — R35.0 URINARY FREQUENCY: ICD-10-CM

## 2023-08-10 LAB
BACTERIA URNS QL MICRO: ABNORMAL /HPF
BILIRUB UR QL STRIP.AUTO: NEGATIVE
BILIRUBIN, POC: NORMAL
BLOOD URINE, POC: NORMAL
CLARITY UR: ABNORMAL
CLARITY, POC: NORMAL
COLOR UR: YELLOW
COLOR, POC: YELLOW
EPI CELLS #/AREA URNS AUTO: 1 /HPF (ref 0–5)
GLUCOSE UR STRIP.AUTO-MCNC: NEGATIVE MG/DL
GLUCOSE URINE, POC: NORMAL
HGB UR QL STRIP.AUTO: NEGATIVE
HYALINE CASTS #/AREA URNS AUTO: 2 /LPF (ref 0–8)
KETONES UR STRIP.AUTO-MCNC: NEGATIVE MG/DL
KETONES, POC: NORMAL
LEUKOCYTE EST, POC: NORMAL
LEUKOCYTE ESTERASE UR QL STRIP.AUTO: ABNORMAL
NITRITE UR QL STRIP.AUTO: POSITIVE
NITRITE, POC: NORMAL
PH UR STRIP.AUTO: 5.5 [PH] (ref 5–8)
PH, POC: 6
PROT UR STRIP.AUTO-MCNC: NEGATIVE MG/DL
PROTEIN, POC: NORMAL
RBC CLUMPS #/AREA URNS AUTO: 1 /HPF (ref 0–4)
SP GR UR STRIP.AUTO: 1.02 (ref 1–1.03)
SPECIFIC GRAVITY, POC: 1.02
UA DIPSTICK W REFLEX MICRO PNL UR: YES
URN SPEC COLLECT METH UR: ABNORMAL
UROBILINOGEN UR STRIP-ACNC: 0.2 E.U./DL
UROBILINOGEN, POC: NORMAL
WBC #/AREA URNS AUTO: 95 /HPF (ref 0–5)

## 2023-08-10 NOTE — PROGRESS NOTES
8/10/2023       HPI:     Name: Stacie Mendez  YOB: 1970    CC: Patient is a 46 y.o. presenting for evaluation of a  possible UTI .     HPI: How long have you had this problem?  weeks  Please rate the severity of your problem: moderate  Anything make it better? Patient states her urine is cloudy and has a foul odor. 6 weeks ago patient was seen in her PCP office in which she dipped positive for a UTI- a culture was sent out but it was lost. Patient was then started on Bactrim. Symptoms continued on, another culture was sent, and the patient was then treated with Ampicillin. Patient states today she is still having cloudy urine with an odor. Patient expresses that she is an IC patient and has received instillations in the past. Patient denies any more frequency or urgency than her normal baseline. Ob/Gyn History:    OB History    Para Term  AB Living   2 2 2     2   SAB IAB Ectopic Molar Multiple Live Births             2      # Outcome Date GA Lbr Fernandez/2nd Weight Sex Delivery Anes PTL Lv   2 Term    6 lb 12 oz (3.062 kg)     ERIC   1 Term    8 lb 6 oz (3.799 kg)     ERIC     Past Medical History:   Past Medical History:   Diagnosis Date    Burning sensation of throat     Chronic constipation     Chronic cryptitis of tonsil     Cystocele, midline     Dyspepsia     Endometrial hyperplasia without atypia, simple     Food allergy     Incomplete uterine prolapse     Interstitial cystitis     Menorrhagia with irregular cycle     Ovarian cyst     Palpitations     Pneumonia     Vertigo      Past Surgical History:   Past Surgical History:   Procedure Laterality Date    HYSTERECTOMY (CERVIX STATUS UNKNOWN)      2021    WISDOM TOOTH EXTRACTION       Allergies:    Allergies   Allergen Reactions    Macrobid [Nitrofurantoin Monohydrate Macrocrystals]      Rapid heartbeat, anxiety, shaking     Current Medications:  Current Outpatient Medications   Medication Sig Dispense Refill    docusate

## 2023-08-12 LAB
BACTERIA UR CULT: ABNORMAL
ORGANISM: ABNORMAL

## 2023-08-14 RX ORDER — SULFAMETHOXAZOLE AND TRIMETHOPRIM 800; 160 MG/1; MG/1
1 TABLET ORAL 2 TIMES DAILY
Qty: 14 TABLET | Refills: 0 | Status: SHIPPED | OUTPATIENT
Start: 2023-08-14 | End: 2023-08-21

## 2023-08-16 ENCOUNTER — TELEPHONE (OUTPATIENT)
Dept: UROGYNECOLOGY | Age: 53
End: 2023-08-16

## 2023-08-16 NOTE — TELEPHONE ENCOUNTER
Called patient this AM to follow up on her [employer] message she had sent. Informed patient that Satish Mejia NP agrees with the decision to be treated with Bactrim at this time. Expressed the importance of taking this medication in its entirety to the patient. Patient verbalized understanding and states after the first dose she already felt much better. RN explained that Satish Mejia NP does not feel it is necessary she drive all the way to the office for a ZAFAR. Informed patient that if she would like to have another culture done after completion of the Bactrim she could let the RN know a lab near her that we could fax an order to for another culture. Patient very grateful for this call and denies any further questions or concerns at this time.

## 2023-09-06 ENCOUNTER — TELEPHONE (OUTPATIENT)
Dept: UROGYNECOLOGY | Age: 53
End: 2023-09-06

## 2023-09-06 NOTE — TELEPHONE ENCOUNTER
Returned patient's call and scheduled her for [facility] nurse visit for tomorrow to obtain sample to verify UTI is gone.

## 2023-09-06 NOTE — TELEPHONE ENCOUNTER
Pt called and said Ana Saleh needed a follow up urine sample. She will be in [place] tomorrow and wants to know if she could do it then.

## 2023-09-07 ENCOUNTER — TELEPHONE (OUTPATIENT)
Dept: UROGYNECOLOGY | Age: 53
End: 2023-09-07

## 2023-09-07 ENCOUNTER — OFFICE VISIT (OUTPATIENT)
Dept: UROGYNECOLOGY | Age: 53
End: 2023-09-07
Payer: COMMERCIAL

## 2023-09-07 VITALS
RESPIRATION RATE: 16 BRPM | SYSTOLIC BLOOD PRESSURE: 115 MMHG | TEMPERATURE: 98 F | HEART RATE: 92 BPM | OXYGEN SATURATION: 99 % | DIASTOLIC BLOOD PRESSURE: 70 MMHG

## 2023-09-07 DIAGNOSIS — R39.15 URINARY URGENCY: Primary | ICD-10-CM

## 2023-09-07 DIAGNOSIS — R35.0 URINARY FREQUENCY: ICD-10-CM

## 2023-09-07 PROCEDURE — 81002 URINALYSIS NONAUTO W/O SCOPE: CPT | Performed by: OBSTETRICS & GYNECOLOGY

## 2023-09-07 PROCEDURE — 99211 OFF/OP EST MAY X REQ PHY/QHP: CPT | Performed by: OBSTETRICS & GYNECOLOGY

## 2023-09-07 NOTE — TELEPHONE ENCOUNTER
Pt requesting to move today's appointment to closer to 4:00 pm.     Rescheduled to 3:45 pm.    9/7/2023 at 9:39 AM gallbladder

## 2023-09-07 NOTE — PROGRESS NOTES
Patient seen in office for Test Of Cure after finishing treatment of antibiotic with recent UTI. Per patient she is feeling better, so s/s of UTI at this point. Vial signs WNL. 8/10/23 -     Component    Organism Escherichia coli Abnormal     Urine Culture, Routine >100,000 CFU/ml    Resulting Agency [facility]        Susceptibility    Escherichia coli (1)    Antibiotic Interpretation Microscan  Method Status    ampicillin Sensitive 4 mcg/mL BACTERIAL SUSCEPTIBILITY PANEL BY [facility]     ampicillin-sulbactam Sensitive <=2 mcg/mL BACTERIAL SUSCEPTIBILITY PANEL BY [facility]     ceFAZolin Sensitive <=4 mcg/mL BACTERIAL SUSCEPTIBILITY PANEL BY [facility]      NOTE: Cefazolin should only be used for uncomplicated UTI         for E.coli or Klebsiella pneumoniae.         cefepime Sensitive <=0.12 mcg/mL BACTERIAL SUSCEPTIBILITY PANEL BY [facility]     cefTRIAXone Sensitive <=0.25 mcg/mL BACTERIAL SUSCEPTIBILITY PANEL BY [facility]     ciprofloxacin Sensitive <=0.25 mcg/mL BACTERIAL SUSCEPTIBILITY PANEL BY [facility]     ertapenem Sensitive <=0.12 mcg/mL BACTERIAL SUSCEPTIBILITY PANEL BY [facility]     gentamicin Sensitive <=1 mcg/mL BACTERIAL SUSCEPTIBILITY PANEL BY [facility]     levofloxacin Sensitive <=0.12 mcg/mL BACTERIAL SUSCEPTIBILITY PANEL BY [facility]     nitrofurantoin Sensitive <=16 mcg/mL BACTERIAL SUSCEPTIBILITY PANEL BY [facility]     piperacillin-tazobactam Sensitive <=4 mcg/mL BACTERIAL SUSCEPTIBILITY PANEL BY [facility]     trimethoprim-sulfamethoxazole Sensitive <=20 mcg/mL BACTERIAL SUSCEPTIBILITY PANEL BY [facility]                Today 9/7/23 -  Results for POC orders placed in visit on 09/07/23   POCT Urinalysis no Micro   Result Value Ref Range    Color, UA yellow     Clarity, UA clear     Glucose, UA POC neg     Bilirubin, UA neg     Ketones, UA neg     Spec Grav, UA 1.015     Blood, UA POC neg     pH, UA 6.5     Protein, UA POC neg     Urobilinogen, UA neg     Leukocytes, UA neg     Nitrite, UA neg        Urine Culture obtained via clean catch per patient

## 2023-09-09 LAB — BACTERIA UR CULT: NORMAL
